# Patient Record
Sex: FEMALE | Race: ASIAN | NOT HISPANIC OR LATINO | Employment: STUDENT | URBAN - METROPOLITAN AREA
[De-identification: names, ages, dates, MRNs, and addresses within clinical notes are randomized per-mention and may not be internally consistent; named-entity substitution may affect disease eponyms.]

---

## 2021-11-29 ASSESSMENT — ANXIETY QUESTIONNAIRES
7. FEELING AFRAID AS IF SOMETHING AWFUL MIGHT HAPPEN: NOT AT ALL
GAD7 TOTAL SCORE: 5
2. NOT BEING ABLE TO STOP OR CONTROL WORRYING: SEVERAL DAYS
6. BECOMING EASILY ANNOYED OR IRRITABLE: NOT AT ALL
4. TROUBLE RELAXING: SEVERAL DAYS
GAD7 TOTAL SCORE: 5
1. FEELING NERVOUS, ANXIOUS, OR ON EDGE: MORE THAN HALF THE DAYS
8. IF YOU CHECKED OFF ANY PROBLEMS, HOW DIFFICULT HAVE THESE MADE IT FOR YOU TO DO YOUR WORK, TAKE CARE OF THINGS AT HOME, OR GET ALONG WITH OTHER PEOPLE?: NOT DIFFICULT AT ALL
7. FEELING AFRAID AS IF SOMETHING AWFUL MIGHT HAPPEN: NOT AT ALL
5. BEING SO RESTLESS THAT IT IS HARD TO SIT STILL: NOT AT ALL
3. WORRYING TOO MUCH ABOUT DIFFERENT THINGS: SEVERAL DAYS
GAD7 TOTAL SCORE: 5

## 2021-11-30 ENCOUNTER — OFFICE VISIT (OUTPATIENT)
Dept: FAMILY MEDICINE | Facility: CLINIC | Age: 26
End: 2021-11-30
Payer: COMMERCIAL

## 2021-11-30 VITALS
DIASTOLIC BLOOD PRESSURE: 76 MMHG | HEIGHT: 64 IN | BODY MASS INDEX: 20.32 KG/M2 | HEART RATE: 85 BPM | SYSTOLIC BLOOD PRESSURE: 109 MMHG | OXYGEN SATURATION: 97 % | WEIGHT: 119 LBS

## 2021-11-30 DIAGNOSIS — Z30.40 ENCOUNTER FOR SURVEILLANCE OF CONTRACEPTIVES, UNSPECIFIED CONTRACEPTIVE: Primary | ICD-10-CM

## 2021-11-30 PROCEDURE — 99203 OFFICE O/P NEW LOW 30 MIN: CPT | Performed by: NURSE PRACTITIONER

## 2021-11-30 RX ORDER — NORGESTIMATE AND ETHINYL ESTRADIOL 7DAYSX3 LO
1 KIT ORAL DAILY
COMMUNITY
End: 2021-11-30

## 2021-11-30 RX ORDER — NORGESTIMATE AND ETHINYL ESTRADIOL 7DAYSX3 LO
1 KIT ORAL DAILY
Qty: 84 TABLET | Refills: 3 | Status: SHIPPED | OUTPATIENT
Start: 2021-11-30 | End: 2022-08-10

## 2021-11-30 ASSESSMENT — PAIN SCALES - GENERAL: PAINLEVEL: NO PAIN (0)

## 2021-11-30 ASSESSMENT — ANXIETY QUESTIONNAIRES: GAD7 TOTAL SCORE: 5

## 2021-11-30 ASSESSMENT — MIFFLIN-ST. JEOR: SCORE: 1274.78

## 2021-11-30 NOTE — PROGRESS NOTES
"Assessment & Plan     Encounter for surveillance of contraceptives, unspecified contraceptive  - Reviewed different contraception options - she would like to continue on OCP  - norgestim-eth estrad triphasic (ORTHO TRI-CYCLEN LO) 0.18/0.215/0.25 MG-25 MCG tablet  Dispense: 84 tablet; Refill: 3    15 minutes spent on the date of the encounter doing chart review, history and exam, documentation and further activities per the note       See Patient Instructions    No follow-ups on file.    RAY Rios CNP [facility] CAROL Sutton is a 26 year old who presents for the following health issues  accompanied by herself.    HPI   Patient is here to establish care.  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  Two year program just started.  Here to discussed contraception.  Was on nexplanon but had an allergic reaction to the plastic - she developed hives.  LMP last week on Wednesday.  Period is every 28 days, lasts 4 days - heavy in the beginning.  Working as a .   Declines any concerns with STI's and declines any other questions or concerns. Needs a refill of here OCP.  Declines any concerns or chance of pregnancy.     Review of Systems   Constitutional, HEENT, cardiovascular, pulmonary, gi and gu systems are negative, except as otherwise noted.      Objective    /76 (BP Location: Right arm, Patient Position: Sitting, Cuff Size: Adult Regular)   Pulse 85   Ht 1.626 m (5' 4\")   Wt 54 kg (119 lb)   LMP 11/24/2021   SpO2 97%   BMI 20.43 kg/m    Body mass index is 20.43 kg/m .  Physical Exam   GENERAL: healthy, alert and no distress  RESP: lungs clear to auscultation - no rales, rhonchi or wheezes  CV: regular rate and rhythm, normal S1 S2, no S3 or S4, no murmur, click or rub, no peripheral edema and peripheral pulses strong  PSYCH: mentation appears normal, affect normal/bright    No diagnotics      Answers for HPI/ROS submitted " by the patient on 11/29/2021  WALLACE 7 TOTAL SCORE: 5

## 2021-11-30 NOTE — NURSING NOTE
Chief Complaint   Patient presents with     Establish Care     new to state from [place]     Refill Request     birth control ariela Torres, EMT at 1:18 PM on 11/30/2021

## 2021-12-12 ENCOUNTER — HEALTH MAINTENANCE LETTER (OUTPATIENT)
Age: 26
End: 2021-12-12

## 2022-08-09 ASSESSMENT — ANXIETY QUESTIONNAIRES
2. NOT BEING ABLE TO STOP OR CONTROL WORRYING: SEVERAL DAYS
8. IF YOU CHECKED OFF ANY PROBLEMS, HOW DIFFICULT HAVE THESE MADE IT FOR YOU TO DO YOUR WORK, TAKE CARE OF THINGS AT HOME, OR GET ALONG WITH OTHER PEOPLE?: SOMEWHAT DIFFICULT
IF YOU CHECKED OFF ANY PROBLEMS ON THIS QUESTIONNAIRE, HOW DIFFICULT HAVE THESE PROBLEMS MADE IT FOR YOU TO DO YOUR WORK, TAKE CARE OF THINGS AT HOME, OR GET ALONG WITH OTHER PEOPLE: SOMEWHAT DIFFICULT
1. FEELING NERVOUS, ANXIOUS, OR ON EDGE: SEVERAL DAYS
6. BECOMING EASILY ANNOYED OR IRRITABLE: SEVERAL DAYS
5. BEING SO RESTLESS THAT IT IS HARD TO SIT STILL: NOT AT ALL
7. FEELING AFRAID AS IF SOMETHING AWFUL MIGHT HAPPEN: NOT AT ALL
GAD7 TOTAL SCORE: 5
3. WORRYING TOO MUCH ABOUT DIFFERENT THINGS: SEVERAL DAYS
GAD7 TOTAL SCORE: 5
4. TROUBLE RELAXING: SEVERAL DAYS
7. FEELING AFRAID AS IF SOMETHING AWFUL MIGHT HAPPEN: NOT AT ALL

## 2022-08-10 ENCOUNTER — APPOINTMENT (OUTPATIENT)
Dept: LAB | Facility: CLINIC | Age: 27
End: 2022-08-10
Attending: MIDWIFE
Payer: COMMERCIAL

## 2022-08-10 ENCOUNTER — OFFICE VISIT (OUTPATIENT)
Dept: OBGYN | Facility: CLINIC | Age: 27
End: 2022-08-10
Attending: MIDWIFE
Payer: COMMERCIAL

## 2022-08-10 VITALS
SYSTOLIC BLOOD PRESSURE: 108 MMHG | HEART RATE: 96 BPM | BODY MASS INDEX: 19.38 KG/M2 | HEIGHT: 64 IN | DIASTOLIC BLOOD PRESSURE: 76 MMHG | WEIGHT: 113.5 LBS

## 2022-08-10 DIAGNOSIS — Z87.898 HISTORY OF SEXUAL VIOLENCE: ICD-10-CM

## 2022-08-10 DIAGNOSIS — N93.0 POSTCOITAL BLEEDING: ICD-10-CM

## 2022-08-10 DIAGNOSIS — N76.0 BACTERIAL VAGINAL INFECTION: ICD-10-CM

## 2022-08-10 DIAGNOSIS — B96.89 BACTERIAL VAGINAL INFECTION: ICD-10-CM

## 2022-08-10 DIAGNOSIS — Z30.40 ENCOUNTER FOR SURVEILLANCE OF CONTRACEPTIVES, UNSPECIFIED CONTRACEPTIVE: ICD-10-CM

## 2022-08-10 DIAGNOSIS — Z01.419 ENCOUNTER FOR GYNECOLOGICAL EXAMINATION WITHOUT ABNORMAL FINDING: ICD-10-CM

## 2022-08-10 DIAGNOSIS — Z00.00 VISIT FOR PREVENTIVE HEALTH EXAMINATION: Primary | ICD-10-CM

## 2022-08-10 DIAGNOSIS — Z12.4 SCREENING FOR MALIGNANT NEOPLASM OF CERVIX: ICD-10-CM

## 2022-08-10 DIAGNOSIS — Z11.3 SCREENING EXAMINATION FOR VENEREAL DISEASE: ICD-10-CM

## 2022-08-10 DIAGNOSIS — Z11.4 SCREENING FOR HIV (HUMAN IMMUNODEFICIENCY VIRUS): ICD-10-CM

## 2022-08-10 LAB
CLUE CELLS: POSITIVE
HCV AB SERPL QL IA: NONREACTIVE
HIV 1+2 AB+HIV1 P24 AG SERPL QL IA: NONREACTIVE
T PALLIDUM AB SER QL: NONREACTIVE
TRICHOMONAS (WET PREP): NEGATIVE
WBC (WET PREP): NEGATIVE
YEAST (WET PREP): NEGATIVE

## 2022-08-10 PROCEDURE — 99213 OFFICE O/P EST LOW 20 MIN: CPT | Mod: 25 | Performed by: MIDWIFE

## 2022-08-10 PROCEDURE — 87210 SMEAR WET MOUNT SALINE/INK: CPT | Performed by: MIDWIFE

## 2022-08-10 PROCEDURE — 87389 HIV-1 AG W/HIV-1&-2 AB AG IA: CPT | Performed by: MIDWIFE

## 2022-08-10 PROCEDURE — G0463 HOSPITAL OUTPT CLINIC VISIT: HCPCS

## 2022-08-10 PROCEDURE — 36415 COLL VENOUS BLD VENIPUNCTURE: CPT | Performed by: MIDWIFE

## 2022-08-10 PROCEDURE — 99385 PREV VISIT NEW AGE 18-39: CPT | Performed by: MIDWIFE

## 2022-08-10 PROCEDURE — 87491 CHLMYD TRACH DNA AMP PROBE: CPT | Performed by: MIDWIFE

## 2022-08-10 PROCEDURE — 87591 N.GONORRHOEAE DNA AMP PROB: CPT | Performed by: MIDWIFE

## 2022-08-10 PROCEDURE — 86780 TREPONEMA PALLIDUM: CPT | Performed by: MIDWIFE

## 2022-08-10 PROCEDURE — 86803 HEPATITIS C AB TEST: CPT | Performed by: MIDWIFE

## 2022-08-10 RX ORDER — METRONIDAZOLE 500 MG/1
500 TABLET ORAL 2 TIMES DAILY
Qty: 14 TABLET | Refills: 0 | Status: SHIPPED | OUTPATIENT
Start: 2022-08-10 | End: 2022-08-17

## 2022-08-10 RX ORDER — NORGESTIMATE AND ETHINYL ESTRADIOL 7DAYSX3 LO
1 KIT ORAL DAILY
Qty: 84 TABLET | Refills: 3 | Status: SHIPPED | OUTPATIENT
Start: 2022-08-10 | End: 2023-07-25
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
## 2022-08-10 NOTE — PATIENT INSTRUCTIONS
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
## 2022-08-11 LAB
C TRACH DNA SPEC QL NAA+PROBE: NEGATIVE
N GONORRHOEA DNA SPEC QL NAA+PROBE: NEGATIVE

## 2022-08-12 ENCOUNTER — APPOINTMENT (OUTPATIENT)
Dept: LAB | Facility: CLINIC | Age: 27
End: 2022-08-12
Payer: COMMERCIAL

## 2022-08-12 LAB
CHOLEST SERPL-MCNC: 146 MG/DL
FASTING STATUS PATIENT QL REPORTED: YES
HDLC SERPL-MCNC: 65 MG/DL
LDLC SERPL CALC-MCNC: 67 MG/DL
NONHDLC SERPL-MCNC: 81 MG/DL
TRIGL SERPL-MCNC: 70 MG/DL

## 2022-08-12 PROCEDURE — 80061 LIPID PANEL: CPT | Performed by: MIDWIFE

## 2022-08-12 PROCEDURE — 36415 COLL VENOUS BLD VENIPUNCTURE: CPT | Performed by: MIDWIFE

## 2022-10-03 ENCOUNTER — HEALTH MAINTENANCE LETTER (OUTPATIENT)
Age: 27
End: 2022-10-03

## 2023-07-25 DIAGNOSIS — Z30.40 ENCOUNTER FOR SURVEILLANCE OF CONTRACEPTIVES, UNSPECIFIED CONTRACEPTIVE: ICD-10-CM

## 2023-07-25 RX ORDER — NORGESTIMATE AND ETHINYL ESTRADIOL 7DAYSX3 LO
1 KIT ORAL DAILY
Qty: 84 TABLET | Refills: 3 | Status: SHIPPED | OUTPATIENT
Start: 2023-07-25 | End: 2023-12-04

## 2023-10-22 ENCOUNTER — HEALTH MAINTENANCE LETTER (OUTPATIENT)
Age: 28
End: 2023-10-22

## 2023-12-04 ENCOUNTER — MYC REFILL (OUTPATIENT)
Dept: OBGYN | Facility: CLINIC | Age: 28
End: 2023-12-04

## 2023-12-04 DIAGNOSIS — Z30.40 ENCOUNTER FOR SURVEILLANCE OF CONTRACEPTIVES, UNSPECIFIED CONTRACEPTIVE: ICD-10-CM

## 2023-12-06 RX ORDER — NORGESTIMATE AND ETHINYL ESTRADIOL 7DAYSX3 LO
1 KIT ORAL DAILY
Qty: 84 TABLET | Refills: 3 | Status: SHIPPED | OUTPATIENT
Start: 2023-12-06 | End: 2024-07-03

## 2024-07-03 ENCOUNTER — OFFICE VISIT (OUTPATIENT)
Dept: OBGYN | Facility: CLINIC | Age: 29
End: 2024-07-03
Attending: ADVANCED PRACTICE MIDWIFE
Payer: COMMERCIAL

## 2024-07-03 VITALS
WEIGHT: 123 LBS | HEART RATE: 77 BPM | DIASTOLIC BLOOD PRESSURE: 80 MMHG | HEIGHT: 63 IN | SYSTOLIC BLOOD PRESSURE: 117 MMHG | BODY MASS INDEX: 21.79 KG/M2

## 2024-07-03 DIAGNOSIS — Z00.00 VISIT FOR PREVENTIVE HEALTH EXAMINATION: Primary | ICD-10-CM

## 2024-07-03 DIAGNOSIS — Z11.3 SCREENING EXAMINATION FOR VENEREAL DISEASE: ICD-10-CM

## 2024-07-03 DIAGNOSIS — Z13.29 SCREENING FOR THYROID DISORDER: ICD-10-CM

## 2024-07-03 DIAGNOSIS — Z13.1 SCREENING FOR DIABETES MELLITUS: ICD-10-CM

## 2024-07-03 DIAGNOSIS — Z30.40 ENCOUNTER FOR SURVEILLANCE OF CONTRACEPTIVES, UNSPECIFIED CONTRACEPTIVE: ICD-10-CM

## 2024-07-03 LAB
BACTERIAL VAGINOSIS VAG-IMP: NEGATIVE
CANDIDA DNA VAG QL NAA+PROBE: NOT DETECTED
CANDIDA GLABRATA / CANDIDA KRUSEI DNA: NOT DETECTED
T VAGINALIS DNA VAG QL NAA+PROBE: NOT DETECTED

## 2024-07-03 PROCEDURE — 99213 OFFICE O/P EST LOW 20 MIN: CPT | Performed by: ADVANCED PRACTICE MIDWIFE

## 2024-07-03 PROCEDURE — 87591 N.GONORRHOEAE DNA AMP PROB: CPT | Performed by: ADVANCED PRACTICE MIDWIFE

## 2024-07-03 PROCEDURE — G0145 SCR C/V CYTO,THINLAYER,RESCR: HCPCS | Performed by: ADVANCED PRACTICE MIDWIFE

## 2024-07-03 PROCEDURE — 0352U MULTIPLEX VAGINAL PANEL BY PCR: CPT | Performed by: ADVANCED PRACTICE MIDWIFE

## 2024-07-03 PROCEDURE — 99395 PREV VISIT EST AGE 18-39: CPT | Performed by: ADVANCED PRACTICE MIDWIFE

## 2024-07-03 PROCEDURE — 87491 CHLMYD TRACH DNA AMP PROBE: CPT | Performed by: ADVANCED PRACTICE MIDWIFE

## 2024-07-03 RX ORDER — NORGESTIMATE AND ETHINYL ESTRADIOL 7DAYSX3 LO
1 KIT ORAL DAILY
Qty: 84 TABLET | Refills: 3 | Status: SHIPPED | OUTPATIENT
Start: 2024-07-03

## 2024-07-03 ASSESSMENT — ENCOUNTER SYMPTOMS
BRUISES/BLEEDS EASILY: 0
FATIGUE: 0
SORE THROAT: 0
TINGLING: 0
ABDOMINAL PAIN: 0
FEVER: 0
DYSPNEA ON EXERTION: 0
HEADACHES: 0
COUGH: 0
SMELL DISTURBANCE: 0
LIGHT-HEADEDNESS: 0
ARTHRALGIAS: 0
MYALGIAS: 0
NAUSEA: 0
NERVOUS/ANXIOUS: 0
WEIGHT GAIN: 0
NUMBNESS: 0
EYE IRRITATION: 0
WEIGHT LOSS: 0
BREAST MASS: 0
DEPRESSION: 0
DYSURIA: 0
SHORTNESS OF BREATH: 0
DIZZINESS: 0
PALPITATIONS: 0
TASTE DISTURBANCE: 0
STIFFNESS: 0
SKIN CHANGES: 0
BREAST PAIN: 0
SWOLLEN GLANDS: 0

## 2024-07-03 ASSESSMENT — PAIN SCALES - GENERAL: PAINLEVEL: NO PAIN (0)

## 2024-07-03 NOTE — LETTER
7/3/2024       RE: Lesley Sibley  [address]  [facility] [address]     Dear Colleague,    Thank you for referring your patient, Lesley Sibley, to the [facility]OM[facility] [place] at [facility]. Please see a copy of my visit note below.      Progress Note    SUBJECTIVE:  Lesley Sibley is an 29 year old, No obstetric history on file., who requests an Annual Preventive Exam.     Concerns today include: Feeling well overall.     Currently sexually active with male and female partners. Uses condoms for STI prevention. Taking Ortho Tri-cyclen Lo, satisfied with this method. Menses regular, light, 3 days.   Desires GC/CT, trich, serum STI testing. Denies symptoms or known exposure.   Question re: HPV vaccine. Reports she received 3 dose series in [place].     Last pap 2021. Due today.    Lipid panel 8/12/22- wnl. Father with hyperlipidemia.     Agreeable to diabetes and thyroid screening.    Works for [employer].       Menstrual History:      11/30/2021     1:20 PM 7/3/2024     2:20 PM   Menstrual History   LAST MENSTRUAL PERIOD 11/24/2021 7/3/2024       Last Pap: summer of 2021 per pt  History of abnormal Pap smear: No - age 21-29 PAP every 3 years recommended      HISTORY:  Current Outpatient Medications   Medication Sig Dispense Refill    norgestim-eth estrad triphasic (ORTHO TRI-CYCLEN LO) 0.18/0.215/0.25 MG-25 MCG tablet Take 1 tablet by mouth daily 84 tablet 3     No current facility-administered medications for this visit.     Allergies   Allergen Reactions    Nexplanon [Etonogestrel] Rash     Hives everywhere had to be removed     Immunization History   Administered Date(s) Administered    COVID-19 Bivalent 12+ (Pfizer) 10/21/2022    Influenza Vaccine >6 months,quad, PF 10/05/2021       OB History   No obstetric history on file.     History reviewed. No pertinent past medical history.  Past Surgical History:   Procedure Laterality Date  "   WISDOM TOOTH EXTRACTION Bilateral      Family History   Problem Relation Age of Onset    Hyperlipidemia Father 69    Seizure Disorder Paternal Grandmother     Parkinsonism Paternal Grandfather      Social History     Socioeconomic History    Marital status: Single   Tobacco Use    Smoking status: Never    Smokeless tobacco: Never   Vaping Use    Vaping status: Never Used   Substance and Sexual Activity    Alcohol use: Yes     Comment: very occasionally    Drug use: Yes     Types: Marijuana    Sexual activity: Yes     Partners: Male     Birth control/protection: Pill       Review of Systems     Constitutional:  Negative for fever, weight loss, weight gain and fatigue.   HENT:  Negative for sore throat, taste disturbance and smell disturbance.    Eyes:  Negative for eye bulging, flashing lights and eye irritation.   Respiratory:   Negative for cough, shortness of breath and dyspnea on exertion.    Cardiovascular:  Negative for chest pain, dyspnea on exertion, palpitations, light-headedness and edema.   Gastrointestinal:  Negative for nausea, abdominal pain and change in stool.   Genitourinary:  Negative for dysuria, urgency, vaginal discharge, dyspareunia and menstrual changes.   Musculoskeletal:  Negative for myalgias, arthralgias and stiffness.   Skin:  Negative for rash, hair changes and skin changes.   Neurological:  Negative for dizziness, tingling, light-headedness, numbness and headaches.   Endo/Heme:  Negative for swollen glands and bruises/bleeds easily.   Psychiatric/Behavioral:  Negative for depression and mood swings.    Breast:  Negative for breast discharge, breast mass and breast pain.     EXAM:  Blood pressure 117/80, pulse 77, height 1.61 m (5' 3.39\"), weight 55.8 kg (123 lb), last menstrual period 07/03/2024. Body mass index is 21.52 kg/m .  General - pleasant female in no acute distress.  Skin - no suspicious lesions or rashes  EENT-  PERRLA, euthyroid with out palpable nodules  Neck - supple " without lymphadenopathy.  Lungs - clear to auscultation bilaterally.  Heart - regular rate and rhythm without murmur.  Abdomen - soft, nontender, nondistended, no masses or organomegaly noted.  Musculoskeletal - no gross deformities.  Neurological - normal strength, sensation, and mental status.    Breast Exam:  Breast: Without visible skin changes. No dimpling or lesions seen.   Breasts supple, non-tender with palpation, no dominant mass, nodularity, or nipple discharge noted bilaterally. Axillary nodes negative.      Pelvic Exam: Urojet lidocaine used for comfort.  EG/BUS: Normal genital architecture without lesions, erythema or abnormal secretions Bartholin's, Urethra, [place]'s normal   Urethral meatus: normal   Urethra: no masses, tenderness, or scarring   Bladder: no masses or tenderness   Vagina: moist, pink, rugae with creamy, white, and odorless  secretions  Cervix: no lesions and pink, moist, closed, without lesion or CMT    ASSESSMENT:  Encounter Diagnoses   Name Primary?    Visit for preventive health examination Yes    Screening examination for venereal disease     Screening for thyroid disorder     Screening for diabetes mellitus     Encounter for surveillance of contraceptives, unspecified contraceptive         PLAN:   Orders Placed This Encounter   Procedures    Obtaining, preparing and conveyance of cervical or vaginal smear to laboratory.    HIV Antigen Antibody Combo    Hepatitis B surface antigen    Treponema Abs w Reflex to RPR and Titer    Hepatitis C antibody    Hemoglobin A1c    TSH with free T4 reflex    HIV Antigen Antibody Combo    Hepatitis B Surface Antigen    Hepatitis C antibody    Treponema Abs w Reflex to RPR and Titer    Pap Thin Layer Screen Reflex to HPV if ASCUS - Recommended Age 25 - 29 Years    Chlamydia trachomatis PCR    Neisseria gonorrhoea PCR    Multiplex Vaginal Panel by PCR    Chlamydia trachomatis/Neisseria gonorrhoeae by PCR    Trichomonas vaginalis by PCR     Orders  Placed This Encounter   Medications    norgestim-eth estrad triphasic (ORTHO TRI-CYCLEN LO) 0.18/0.215/0.25 MG-25 MCG tablet     Sig: Take 1 tablet by mouth daily     Dispense:  84 tablet     Refill:  3       Additional teaching done at this visit regarding self breast exam, birth control, and mental health.    - Discussed efficacy and protection of prior HPV vaccination series.    - Pap obtained.  - GC/CT and multiplex vaginal PCR swab collected.  - HIV, hepatitis b, hepatitis c, anti-trep ordered.  Standing orders placed- reviewed pt may go for routine screening prn. Notify clinic if exposure or symptoms.  - Refill sent for JOSE MIGUEL.   - Orders placed for hemoglobin A1c, tsh with t4 reflex.    Return to clinic in one year.  Follow-up as needed.    I, Swetha Lechuga, am serving as a scribe; to document services personally performed by RAY Cortes CNM based on data collection and the provider's statements to me.     Swetha Lechuga    The encounter was performed by me and scribed by the SNM. The scribed note accurately reflects my personal services and decisions made by me.     RAY Cortes, [place]

## 2024-07-03 NOTE — PATIENT INSTRUCTIONS
Thank you for trusting us with your care!     If you need to contact us for questions about:  Symptoms, Scheduling & Medical Questions; Non-urgent (2-3 day response) Angelicabrando message, Urgent (needing response today) [phone] (if after 3:30pm next day response)   Prescriptions: Please call your Pharmacy   Billing: [facility] [phone] or [facility]:[phone]    PREVENTIVE HEALTH RECOMMENDATIONS:   Most women need a yearly breast and pelvic exam.    A PAP screen, a test done DURING a pelvic exam, is NO longer recommended yearly.    March 2013, screening guidelines recommended by ACOG for PAP screen are:    1) First pap at age 21.    2) Pap every 3 years until age 30.    3) After age 30, pap every 3 years or Pap with HR HPV screen every 5 years until age 65.  4) Women do NOT need a vaginal Pap screen after a hysterectomy (surgical removal of the uterus) when they have not had cancer.    Exceptions:  1) Yearly pap if HIV+ or immunosuppressed secondary to organ transplant  2) RAJNI II-III continue routine screening for 20 years.    I encourage you continue looking for opportunities to choose a healthy lifestyle:       * Choose to eat a heart healthy diet. Check out the FOOD PLATE guidelines at: http://www.choosemyplate.gov/ for helpful hints on weight and cholesterol management.  Balance your caloric intake with exercise to maintain a BMI in the 22 to 26 range. For bone health: Eat calcium-rich foods like yogurt, broccoli or take chewable calcium pills (500 to 600 mg) twice a day with food.       * Exercise for at least an average of 30 minutes a day, 5 days of the week. This will help you control your weight, release stress, and help prevent disease.      * Take a Vitamin D3 supplement daily fall through spring and during summer unless you [id number]' full body sun exposure to skin without sunscreen.      * DO wear sunscreen to prevent skin cancer after the first 15-30 minutes.      * Identify stressors in your  life, find ways to release the stress, and, make time for yourself. PLEASE ask for help if mood changes last longer than two weeks.     * Limit alcohol to one drink per day.  No smoking.  Avoid second hand smoke. If you smoke, ask for help to stop.       *  If you are in a sexual relationship, talk with your partner about possible infection risks and take action to protect yourself from exposure to a sexual infection.    Please request an infection screen for STIs (sexually transmitted infections) if you are less than age 26 OR believe that you may be at risk.     Get a flu shot each year. Get a tetanus shot every 10 years. EVERYONE needs a pertussis (Whooping cough) booster.    See your dentist twice a year for an exam and preventive care cleaning.     Consider the following screen tests:    1) cholesterol test every 5 years.     2) yearly mammogram after age 40 unless you have identified risks.    3) colonoscopy every 10 years after age 50 unless you have identified risks.    4) diabetes blood test screening if you are at risk for diabetes.      Additional information that you may also find helpful:  The Internet now gives us access to LOTS of information -- some of it helpful, research documented and also plenty of harmful, anecdotal information that may not pertain to your situtaion. Consider visiting the following websites for accurate health information:    www.vitamindcouncil.org/ : Info and ongoing research re Vitamin D    www.fairview.org : Up to date and easily searchable information on multiple topics.    www.medlineplus.gov : medication info, interactive tutorials, watch real surgeries online    www.cdc.gov : public health info, travel advisories, epidemics (H1N1)    www.alverto/std.org: current research re diagnosis, treatment and prevention of sexually contacted infections.    www.health.state.[place] : [place] dept of heatlh, public health issues in [place], N1N1    www.familydoctor.org : good info from the Academy  of Family Physicians

## 2024-07-03 NOTE — PROGRESS NOTES
Progress Note    SUBJECTIVE:  Lesley Sibley is an 29 year old, No obstetric history on file., who requests an Annual Preventive Exam.     Concerns today include: Feeling well overall.     Currently sexually active with male and female partners. Uses condoms for STI prevention. Taking Ortho Tri-cyclen Lo, satisfied with this method. Menses regular, light, 3 days.   Desires GC/CT, trich, serum STI testing. Denies symptoms or known exposure.   Question re: HPV vaccine. Reports she received 3 dose series in [place].     Last pap 2021. Due today.    Lipid panel 8/12/22- wnl. Father with hyperlipidemia.     Agreeable to diabetes and thyroid screening.    Works for [employer].       Menstrual History:      11/30/2021     1:20 PM 7/3/2024     2:20 PM   Menstrual History   LAST MENSTRUAL PERIOD 11/24/2021 7/3/2024       Last Pap: summer of 2021 per pt  History of abnormal Pap smear: No - age 21-29 PAP every 3 years recommended      HISTORY:  Current Outpatient Medications   Medication Sig Dispense Refill    norgestim-eth estrad triphasic (ORTHO TRI-CYCLEN LO) 0.18/0.215/0.25 MG-25 MCG tablet Take 1 tablet by mouth daily 84 tablet 3     No current facility-administered medications for this visit.     Allergies   Allergen Reactions    Nexplanon [Etonogestrel] Rash     Hives everywhere had to be removed     Immunization History   Administered Date(s) Administered    COVID-19 Bivalent 12+ (Pfizer) 10/21/2022    Influenza Vaccine >6 months,quad, PF 10/05/2021       OB History   No obstetric history on file.     History reviewed. No pertinent past medical history.  Past Surgical History:   Procedure Laterality Date    WISDOM TOOTH EXTRACTION Bilateral      Family History   Problem Relation Age of Onset    Hyperlipidemia Father 69    Seizure Disorder Paternal Grandmother     Parkinsonism Paternal Grandfather      Social History     Socioeconomic History    Marital status: Single   Tobacco Use    Smoking status:  "Never    Smokeless tobacco: Never   Vaping Use    Vaping status: Never Used   Substance and Sexual Activity    Alcohol use: Yes     Comment: very occasionally    Drug use: Yes     Types: Marijuana    Sexual activity: Yes     Partners: Male     Birth control/protection: Pill       Review of Systems     Constitutional:  Negative for fever, weight loss, weight gain and fatigue.   HENT:  Negative for sore throat, taste disturbance and smell disturbance.    Eyes:  Negative for eye bulging, flashing lights and eye irritation.   Respiratory:   Negative for cough, shortness of breath and dyspnea on exertion.    Cardiovascular:  Negative for chest pain, dyspnea on exertion, palpitations, light-headedness and edema.   Gastrointestinal:  Negative for nausea, abdominal pain and change in stool.   Genitourinary:  Negative for dysuria, urgency, vaginal discharge, dyspareunia and menstrual changes.   Musculoskeletal:  Negative for myalgias, arthralgias and stiffness.   Skin:  Negative for rash, hair changes and skin changes.   Neurological:  Negative for dizziness, tingling, light-headedness, numbness and headaches.   Endo/Heme:  Negative for swollen glands and bruises/bleeds easily.   Psychiatric/Behavioral:  Negative for depression and mood swings.    Breast:  Negative for breast discharge, breast mass and breast pain.     EXAM:  Blood pressure 117/80, pulse 77, height 1.61 m (5' 3.39\"), weight 55.8 kg (123 lb), last menstrual period 07/03/2024. Body mass index is 21.52 kg/m .  General - pleasant female in no acute distress.  Skin - no suspicious lesions or rashes  EENT-  PERRLA, euthyroid with out palpable nodules  Neck - supple without lymphadenopathy.  Lungs - clear to auscultation bilaterally.  Heart - regular rate and rhythm without murmur.  Abdomen - soft, nontender, nondistended, no masses or organomegaly noted.  Musculoskeletal - no gross deformities.  Neurological - normal strength, sensation, and mental " status.    Breast Exam:  Breast: Without visible skin changes. No dimpling or lesions seen.   Breasts supple, non-tender with palpation, no dominant mass, nodularity, or nipple discharge noted bilaterally. Axillary nodes negative.      Pelvic Exam: Urojet lidocaine used for comfort.  EG/BUS: Normal genital architecture without lesions, erythema or abnormal secretions Bartholin's, Urethra, Kenvir's normal   Urethral meatus: normal   Urethra: no masses, tenderness, or scarring   Bladder: no masses or tenderness   Vagina: moist, pink, rugae with creamy, white, and odorless  secretions  Cervix: no lesions and pink, moist, closed, without lesion or CMT    ASSESSMENT:  Encounter Diagnoses   Name Primary?    Visit for preventive health examination Yes    Screening examination for venereal disease     Screening for thyroid disorder     Screening for diabetes mellitus     Encounter for surveillance of contraceptives, unspecified contraceptive         PLAN:   Orders Placed This Encounter   Procedures    Obtaining, preparing and conveyance of cervical or vaginal smear to laboratory.    HIV Antigen Antibody Combo    Hepatitis B surface antigen    Treponema Abs w Reflex to RPR and Titer    Hepatitis C antibody    Hemoglobin A1c    TSH with free T4 reflex    HIV Antigen Antibody Combo    Hepatitis B Surface Antigen    Hepatitis C antibody    Treponema Abs w Reflex to RPR and Titer    Pap Thin Layer Screen Reflex to HPV if ASCUS - Recommended Age 25 - 29 Years    Chlamydia trachomatis PCR    Neisseria gonorrhoea PCR    Multiplex Vaginal Panel by PCR    Chlamydia trachomatis/Neisseria gonorrhoeae by PCR    Trichomonas vaginalis by PCR     Orders Placed This Encounter   Medications    norgestim-eth estrad triphasic (ORTHO TRI-CYCLEN LO) 0.18/0.215/0.25 MG-25 MCG tablet     Sig: Take 1 tablet by mouth daily     Dispense:  84 tablet     Refill:  3       Additional teaching done at this visit regarding self breast exam, birth control,  and mental health.    - Discussed efficacy and protection of prior HPV vaccination series.    - Pap obtained.  - GC/CT and multiplex vaginal PCR swab collected.  - HIV, hepatitis b, hepatitis c, anti-trep ordered.  Standing orders placed- reviewed pt may go for routine screening prn. Notify clinic if exposure or symptoms.  - Refill sent for JOSE MIGUEL.   - Orders placed for hemoglobin A1c, tsh with t4 reflex.    Return to clinic in one year.  Follow-up as needed.    I, Swetha Lechuga, am serving as a scribe; to document services personally performed by RAY Cortes CNM based on data collection and the provider's statements to me.     Swetha Lechuga    The encounter was performed by me and scribed by the SNM. The scribed note accurately reflects my personal services and decisions made by me.     RAY Cortes, [place]

## 2024-07-04 LAB
C TRACH DNA SPEC QL NAA+PROBE: NEGATIVE
N GONORRHOEA DNA SPEC QL NAA+PROBE: NEGATIVE

## 2024-07-10 LAB
BKR LAB AP GYN ADEQUACY: NORMAL
BKR LAB AP GYN INTERPRETATION: NORMAL
BKR LAB AP HPV REFLEX: NORMAL
BKR LAB AP PREVIOUS ABNORMAL: NORMAL
PATH REPORT.COMMENTS IMP SPEC: NORMAL
PATH REPORT.COMMENTS IMP SPEC: NORMAL
PATH REPORT.RELEVANT HX SPEC: NORMAL

## 2025-08-10 ENCOUNTER — HEALTH MAINTENANCE LETTER (OUTPATIENT)
Age: 30
End: 2025-08-10

## 2025-08-20 DIAGNOSIS — Z30.40 ENCOUNTER FOR SURVEILLANCE OF CONTRACEPTIVES, UNSPECIFIED CONTRACEPTIVE: ICD-10-CM

## 2025-08-27 RX ORDER — NORGESTIMATE AND ETHINYL ESTRADIOL
1 KIT DAILY
Qty: 84 TABLET | Refills: 3 | Status: SHIPPED | OUTPATIENT
Start: 2025-08-27